# Patient Record
Sex: MALE | Race: WHITE | NOT HISPANIC OR LATINO | URBAN - METROPOLITAN AREA
[De-identification: names, ages, dates, MRNs, and addresses within clinical notes are randomized per-mention and may not be internally consistent; named-entity substitution may affect disease eponyms.]

---

## 2017-07-28 ENCOUNTER — ALLSCRIPTS OFFICE VISIT (OUTPATIENT)
Dept: OTHER | Facility: OTHER | Age: 57
End: 2017-07-28

## 2018-01-12 VITALS
HEART RATE: 68 BPM | RESPIRATION RATE: 16 BRPM | BODY MASS INDEX: 21.33 KG/M2 | DIASTOLIC BLOOD PRESSURE: 94 MMHG | SYSTOLIC BLOOD PRESSURE: 152 MMHG | WEIGHT: 144 LBS | HEIGHT: 69 IN

## 2021-04-19 ENCOUNTER — OFFICE VISIT (OUTPATIENT)
Dept: FAMILY MEDICINE CLINIC | Facility: CLINIC | Age: 61
End: 2021-04-19

## 2021-04-19 VITALS
WEIGHT: 142.8 LBS | HEART RATE: 68 BPM | SYSTOLIC BLOOD PRESSURE: 122 MMHG | RESPIRATION RATE: 18 BRPM | HEIGHT: 69 IN | TEMPERATURE: 98.3 F | BODY MASS INDEX: 21.15 KG/M2 | DIASTOLIC BLOOD PRESSURE: 78 MMHG

## 2021-04-19 DIAGNOSIS — Z12.11 SCREENING FOR MALIGNANT NEOPLASM OF COLON: Primary | ICD-10-CM

## 2021-04-19 PROBLEM — R10.30 GROIN PAIN: Status: ACTIVE | Noted: 2017-07-28

## 2021-04-19 PROCEDURE — 99203 OFFICE O/P NEW LOW 30 MIN: CPT | Performed by: FAMILY MEDICINE

## 2021-04-19 RX ORDER — OMEGA-3 FATTY ACIDS/FISH OIL 300-1000MG
CAPSULE ORAL
COMMUNITY

## 2021-04-19 NOTE — PROGRESS NOTES
Assessment/Plan:    Groin sprain  Discussed pathophysiology of how testicles go up and down secondary to heat and cold  Also that the left testicle is always lower than the right  Patient will use a jockstrap to try to keep it in place when it seems lower and to make him more comfortable  Reassured everything is normal    Recheck as needed          Tobacco Cessation Counseling: Tobacco cessation counseling was provided  The patient is sincerely urged to quit consumption of tobacco  He is not ready to quit tobacco          Subjective:   Valerio Fernandez is a 64 y o male  Chief Complaint   Patient presents with    Testicle Pain     Patient is here with left-sided groin concerns  He has had groin sprains in the past as he is a   Week ago his left testicle felt inflamed but mostly it was lower than it normally is this concerned him  So he took a truck down to [place] was therefore we can did do anything was fine  On the way back he had a long [employer] old truck that was not articulated, the roads were lifted and concrete was sticking up so there was a lot of bumps in the road  He also had no load so it did not take them very well  He had a seatbelt done and a lot of times is pulled across his groin and was very uncomfortable    Patient then noticed that his left testicle seem to be lower than the right and was getting aggravated on the close  He also noticed that it made it difficult for him the walk  He has never had problems this bad    Last time he was here was 2017 with similar issues but mostly a groin sprain that was relieved with anti-inflammatories      Past Medical History:   Diagnosis Date    Lupus ([facility] [address] )     Lyme disease      Social History     Tobacco Use    Smoking status: Current Every Day Smoker    Smokeless tobacco: Never Used   Substance Use Topics    Alcohol use: Yes     Frequency: 4 or more times a week    Drug use: Never     Family History   Problem Relation Age of Onset    Mental illness Mother     No Known Problems Father     Alcohol abuse Neg Hx     Substance Abuse Neg Hx        MEDICATIONS REVIEWED AND UPDATED    10 point review of systems performed, the remainder of the ROS is negative except for what is noted in the history of chief complaint    Objective:    Vitals:    04/19/21 1117   BP: 122/78   Pulse: 68   Resp: 18   Temp: 98 3 °F (36 8 °C)     Body mass index is 21 24 kg/m²  Physical Exam    General  Patient in no acute distress, well appearing, well nourished and appears stated age    Mental status  Good judgment and insight, oriented to time person and place, recent and remote memory is intact, mood and affect are normal, cooperative, and patient is reasonable        Descended testes bilaterally no masses no tenderness no pain left lower than the right  No groin tenderness no groin masses no evidence of hernia  Prostate exam smooth small nontender no nodules no heat

## 2021-04-19 NOTE — PATIENT INSTRUCTIONS
1  For the next week try a a jockstrap without the cup for support  Then just use it as needed    Otherwise use boxer briefs  Everything looks perfect    Continue Advil for discomfort